# Patient Record
Sex: FEMALE | HISPANIC OR LATINO | Employment: STUDENT | URBAN - METROPOLITAN AREA
[De-identification: names, ages, dates, MRNs, and addresses within clinical notes are randomized per-mention and may not be internally consistent; named-entity substitution may affect disease eponyms.]

---

## 2023-09-16 ENCOUNTER — HOSPITAL ENCOUNTER (OUTPATIENT)
Dept: DATA CONVERSION | Facility: HOSPITAL | Age: 23
Discharge: HOME | End: 2023-09-16
Payer: COMMERCIAL

## 2023-09-16 DIAGNOSIS — E78.5 HYPERLIPIDEMIA, UNSPECIFIED: ICD-10-CM

## 2023-09-16 DIAGNOSIS — L68.0 HIRSUTISM: ICD-10-CM

## 2023-09-16 DIAGNOSIS — E78.6 LIPOPROTEIN DEFICIENCY: ICD-10-CM

## 2023-09-16 DIAGNOSIS — I10 ESSENTIAL (PRIMARY) HYPERTENSION: ICD-10-CM

## 2023-09-16 LAB
ALBUMIN SERPL-MCNC: 4.2 GM/DL (ref 3.5–5)
ALBUMIN/GLOB SERPL: 1.3 RATIO (ref 1.5–3)
ALP BLD-CCNC: 65 U/L (ref 35–125)
ALT SERPL-CCNC: 15 U/L (ref 5–40)
ANION GAP SERPL CALCULATED.3IONS-SCNC: 11 MMOL/L (ref 0–19)
APPEARANCE PLAS: ABNORMAL
AST SERPL-CCNC: 17 U/L (ref 5–40)
BASOPHILS # BLD AUTO: 0.02 K/UL (ref 0–0.22)
BASOPHILS NFR BLD AUTO: 0.3 % (ref 0–1)
BILIRUB SERPL-MCNC: 0.2 MG/DL (ref 0.1–1.2)
BUN SERPL-MCNC: 9 MG/DL (ref 8–25)
BUN/CREAT SERPL: 15 RATIO (ref 8–21)
CALCIUM SERPL-MCNC: 9.3 MG/DL (ref 8.5–10.4)
CHLORIDE SERPL-SCNC: 106 MMOL/L (ref 97–107)
CHOLEST SERPL-MCNC: 206 MG/DL (ref 133–200)
CHOLEST/HDLC SERPL: 6.6 RATIO
CO2 SERPL-SCNC: 26 MMOL/L (ref 24–31)
COLOR SPUN FLD: ABNORMAL
CREAT SERPL-MCNC: 0.6 MG/DL (ref 0.4–1.6)
DEPRECATED RDW RBC AUTO: 42.3 FL (ref 37–54)
DIFFERENTIAL METHOD BLD: ABNORMAL
EOSINOPHIL # BLD AUTO: 0.32 K/UL (ref 0–0.45)
EOSINOPHIL NFR BLD: 4.3 % (ref 0–3)
ERYTHROCYTE [DISTWIDTH] IN BLOOD BY AUTOMATED COUNT: 12.5 % (ref 11.7–15)
FASTING STATUS PATIENT QL REPORTED: ABNORMAL
GFR SERPL CREATININE-BSD FRML MDRD: 130 ML/MIN/1.73 M2
GLOBULIN SER-MCNC: 3.2 G/DL (ref 1.9–3.7)
GLUCOSE SERPL-MCNC: 130 MG/DL (ref 65–99)
HBA1C MFR BLD: 6.1 % (ref 4–6)
HCT VFR BLD AUTO: 44.7 % (ref 36–44)
HDLC SERPL-MCNC: 31 MG/DL
HGB BLD-MCNC: 14.4 GM/DL (ref 12–15)
IMM GRANULOCYTES # BLD AUTO: 0.01 K/UL (ref 0–0.1)
LDLC SERPL CALC-MCNC: 127 MG/DL (ref 65–130)
LYMPHOCYTES # BLD AUTO: 2.65 K/UL (ref 1.2–3.2)
LYMPHOCYTES NFR BLD MANUAL: 35.7 % (ref 20–40)
MCH RBC QN AUTO: 29.5 PG (ref 26–34)
MCHC RBC AUTO-ENTMCNC: 32.2 % (ref 31–37)
MCV RBC AUTO: 91.6 FL (ref 80–100)
MONOCYTES # BLD AUTO: 0.45 K/UL (ref 0–0.8)
MONOCYTES NFR BLD MANUAL: 6.1 % (ref 0–8)
NEUTROPHILS # BLD AUTO: 3.97 K/UL
NEUTROPHILS # BLD AUTO: 3.97 K/UL (ref 1.8–7.7)
NEUTROPHILS.IMMATURE NFR BLD: 0.1 % (ref 0–1)
NEUTS SEG NFR BLD: 53.5 % (ref 50–70)
NRBC BLD-RTO: 0 /100 WBC
PLATELET # BLD AUTO: 215 K/UL (ref 150–450)
PMV BLD AUTO: 11.7 CU (ref 7–12.6)
POTASSIUM SERPL-SCNC: 4.2 MMOL/L (ref 3.4–5.1)
PROLACTIN SERPL-MCNC: 127.9 NG/ML (ref 4.8–23.3)
PROT SERPL-MCNC: 7.4 G/DL (ref 5.9–7.9)
RBC # BLD AUTO: 4.88 M/UL (ref 4–4.9)
SODIUM SERPL-SCNC: 143 MMOL/L (ref 133–145)
TRIGL SERPL-MCNC: 238 MG/DL (ref 40–150)
WBC # BLD AUTO: 7.4 K/UL (ref 4.5–11)

## 2023-12-21 ENCOUNTER — OFFICE VISIT (OUTPATIENT)
Dept: PRIMARY CARE | Facility: CLINIC | Age: 23
End: 2023-12-21
Payer: COMMERCIAL

## 2023-12-21 VITALS
DIASTOLIC BLOOD PRESSURE: 80 MMHG | OXYGEN SATURATION: 96 % | SYSTOLIC BLOOD PRESSURE: 118 MMHG | TEMPERATURE: 97.9 F | WEIGHT: 247 LBS | BODY MASS INDEX: 38.77 KG/M2 | HEART RATE: 96 BPM | HEIGHT: 67 IN

## 2023-12-21 DIAGNOSIS — K52.9 ACUTE GASTROENTERITIS: Primary | ICD-10-CM

## 2023-12-21 DIAGNOSIS — R11.2 NAUSEA AND VOMITING, UNSPECIFIED VOMITING TYPE: ICD-10-CM

## 2023-12-21 PROBLEM — N92.6 IRREGULAR PERIODS: Status: RESOLVED | Noted: 2020-01-24 | Resolved: 2023-12-21

## 2023-12-21 PROBLEM — M79.604 RIGHT LEG PAIN: Status: RESOLVED | Noted: 2020-10-19 | Resolved: 2023-12-21

## 2023-12-21 PROBLEM — E66.01 MORBID OBESITY (MULTI): Status: ACTIVE | Noted: 2023-12-21

## 2023-12-21 PROBLEM — R11.10 POST-TUSSIVE EMESIS: Status: RESOLVED | Noted: 2022-05-02 | Resolved: 2023-12-21

## 2023-12-21 PROBLEM — F30.8 EXCITATIVE TYPE PSYCHOSIS (MULTI): Status: RESOLVED | Noted: 2023-12-21 | Resolved: 2023-12-21

## 2023-12-21 PROBLEM — M25.571 BILATERAL ANKLE PAIN: Status: RESOLVED | Noted: 2021-05-11 | Resolved: 2023-12-21

## 2023-12-21 PROBLEM — M79.671 BILATERAL FOOT PAIN: Status: RESOLVED | Noted: 2021-05-11 | Resolved: 2023-12-21

## 2023-12-21 PROBLEM — R79.89 ELEVATED PROLACTIN LEVEL: Status: RESOLVED | Noted: 2022-05-25 | Resolved: 2023-12-21

## 2023-12-21 PROBLEM — R73.03 PREDIABETES: Status: ACTIVE | Noted: 2022-05-12

## 2023-12-21 PROBLEM — F32.3 SEVERE MAJOR DEPRESSION WITH PSYCHOTIC FEATURES (MULTI): Status: ACTIVE | Noted: 2023-12-21

## 2023-12-21 PROBLEM — F99 MENTAL DISORDER: Status: RESOLVED | Noted: 2023-12-21 | Resolved: 2023-12-21

## 2023-12-21 PROBLEM — R05.1 ACUTE COUGH: Status: RESOLVED | Noted: 2023-02-14 | Resolved: 2023-12-21

## 2023-12-21 PROBLEM — R56.9 SEIZURE (MULTI): Status: ACTIVE | Noted: 2023-12-21

## 2023-12-21 PROBLEM — J34.89 RHINORRHEA: Status: RESOLVED | Noted: 2022-05-02 | Resolved: 2023-12-21

## 2023-12-21 PROBLEM — R07.0 THROAT PAIN: Status: RESOLVED | Noted: 2023-02-14 | Resolved: 2023-12-21

## 2023-12-21 PROBLEM — M79.672 BILATERAL FOOT PAIN: Status: RESOLVED | Noted: 2021-05-11 | Resolved: 2023-12-21

## 2023-12-21 PROBLEM — Z86.16 HISTORY OF COVID-19: Status: RESOLVED | Noted: 2023-12-21 | Resolved: 2023-12-21

## 2023-12-21 PROBLEM — I10 ESSENTIAL HYPERTENSION: Status: ACTIVE | Noted: 2020-01-24

## 2023-12-21 PROBLEM — E55.9 VITAMIN D DEFICIENCY: Status: ACTIVE | Noted: 2023-12-21

## 2023-12-21 PROBLEM — E78.5 HYPERLIPIDEMIA: Status: ACTIVE | Noted: 2020-01-24

## 2023-12-21 PROBLEM — R00.0 TACHYCARDIA: Status: RESOLVED | Noted: 2021-05-11 | Resolved: 2023-12-21

## 2023-12-21 PROBLEM — B07.9 WARTS: Status: RESOLVED | Noted: 2022-05-12 | Resolved: 2023-12-21

## 2023-12-21 PROBLEM — R44.0 AUDITORY HALLUCINATIONS: Status: RESOLVED | Noted: 2023-12-21 | Resolved: 2023-12-21

## 2023-12-21 PROBLEM — U07.1 PNEUMONIA DUE TO COVID-19 VIRUS: Status: RESOLVED | Noted: 2021-01-29 | Resolved: 2023-12-21

## 2023-12-21 PROBLEM — E28.2 POLYCYSTIC OVARY SYNDROME: Status: ACTIVE | Noted: 2020-01-24

## 2023-12-21 PROBLEM — E22.1 HYPERPROLACTINEMIA (MULTI): Status: ACTIVE | Noted: 2020-02-28

## 2023-12-21 PROBLEM — F84.0 AUTISM (HHS-HCC): Status: ACTIVE | Noted: 2020-01-24

## 2023-12-21 PROBLEM — L68.0 HIRSUTISM: Status: ACTIVE | Noted: 2023-12-21

## 2023-12-21 PROBLEM — E23.6: Status: RESOLVED | Noted: 2020-10-21 | Resolved: 2023-12-21

## 2023-12-21 PROBLEM — N91.2 AMENORRHEA: Status: RESOLVED | Noted: 2020-02-28 | Resolved: 2023-12-21

## 2023-12-21 PROBLEM — E87.6 HYPOKALEMIA: Status: RESOLVED | Noted: 2023-12-21 | Resolved: 2023-12-21

## 2023-12-21 PROBLEM — R45.6 VIOLENT BEHAVIOR: Status: RESOLVED | Noted: 2023-12-21 | Resolved: 2023-12-21

## 2023-12-21 PROBLEM — R26.9 GAIT ABNORMALITY: Status: ACTIVE | Noted: 2021-05-11

## 2023-12-21 PROBLEM — R40.1 CLOUDED CONSCIOUSNESS: Status: RESOLVED | Noted: 2023-12-21 | Resolved: 2023-12-21

## 2023-12-21 PROBLEM — J00 ACUTE NASOPHARYNGITIS: Status: RESOLVED | Noted: 2022-05-02 | Resolved: 2023-12-21

## 2023-12-21 PROBLEM — F20.9 SCHIZOPHRENIA (MULTI): Status: ACTIVE | Noted: 2020-01-24

## 2023-12-21 PROBLEM — E78.6 LOW HDL (UNDER 40): Status: ACTIVE | Noted: 2023-12-21

## 2023-12-21 PROBLEM — R51.9 HEADACHE: Status: RESOLVED | Noted: 2023-02-14 | Resolved: 2023-12-21

## 2023-12-21 PROBLEM — M25.572 BILATERAL ANKLE PAIN: Status: RESOLVED | Noted: 2021-05-11 | Resolved: 2023-12-21

## 2023-12-21 PROBLEM — R68.83 CHILL: Status: RESOLVED | Noted: 2023-12-21 | Resolved: 2023-12-21

## 2023-12-21 PROBLEM — R55 SYNCOPE: Status: RESOLVED | Noted: 2023-12-21 | Resolved: 2023-12-21

## 2023-12-21 PROBLEM — J12.82 PNEUMONIA DUE TO COVID-19 VIRUS: Status: RESOLVED | Noted: 2021-01-29 | Resolved: 2023-12-21

## 2023-12-21 PROCEDURE — 3074F SYST BP LT 130 MM HG: CPT | Performed by: FAMILY MEDICINE

## 2023-12-21 PROCEDURE — 1036F TOBACCO NON-USER: CPT | Performed by: FAMILY MEDICINE

## 2023-12-21 PROCEDURE — 3079F DIAST BP 80-89 MM HG: CPT | Performed by: FAMILY MEDICINE

## 2023-12-21 PROCEDURE — 99213 OFFICE O/P EST LOW 20 MIN: CPT | Performed by: FAMILY MEDICINE

## 2023-12-21 RX ORDER — LORAZEPAM 0.5 MG/1
0.5 TABLET ORAL 3 TIMES DAILY
COMMUNITY
Start: 2023-09-11

## 2023-12-21 RX ORDER — ARIPIPRAZOLE 20 MG/1
1 TABLET ORAL DAILY
COMMUNITY
Start: 2018-12-29

## 2023-12-21 RX ORDER — TRAZODONE HYDROCHLORIDE 50 MG/1
50 TABLET ORAL NIGHTLY
COMMUNITY

## 2023-12-21 RX ORDER — CHLORTHALIDONE 25 MG/1
TABLET ORAL
COMMUNITY
Start: 2019-03-01

## 2023-12-21 RX ORDER — ESCITALOPRAM OXALATE 20 MG/1
20 TABLET ORAL
COMMUNITY

## 2023-12-21 RX ORDER — PROMETHAZINE HYDROCHLORIDE 25 MG/1
25 TABLET ORAL EVERY 6 HOURS PRN
Qty: 20 TABLET | Refills: 0 | Status: SHIPPED | OUTPATIENT
Start: 2023-12-21

## 2023-12-21 RX ORDER — HALOPERIDOL 10 MG/1
10 TABLET ORAL 2 TIMES DAILY
COMMUNITY

## 2023-12-21 RX ORDER — RISPERIDONE 1 MG/1
TABLET ORAL
COMMUNITY

## 2023-12-21 RX ORDER — METFORMIN HYDROCHLORIDE 500 MG/1
1 TABLET ORAL DAILY
COMMUNITY
Start: 2018-12-29

## 2023-12-21 RX ORDER — LORATADINE 10 MG/1
TABLET ORAL
COMMUNITY
Start: 2023-02-14 | End: 2024-02-27 | Stop reason: WASHOUT

## 2023-12-21 RX ORDER — PALIPERIDONE 9 MG/1
1 TABLET, EXTENDED RELEASE ORAL DAILY
COMMUNITY
Start: 2019-02-20

## 2023-12-21 RX ORDER — GUANFACINE 1 MG/1
1 TABLET, EXTENDED RELEASE ORAL DAILY
COMMUNITY
Start: 2020-04-20

## 2023-12-21 ASSESSMENT — ENCOUNTER SYMPTOMS
DIARRHEA: 1
ABDOMINAL PAIN: 1
NAUSEA: 1
VOMITING: 1

## 2023-12-21 ASSESSMENT — PATIENT HEALTH QUESTIONNAIRE - PHQ9
SUM OF ALL RESPONSES TO PHQ9 QUESTIONS 1 AND 2: 0
1. LITTLE INTEREST OR PLEASURE IN DOING THINGS: NOT AT ALL
2. FEELING DOWN, DEPRESSED OR HOPELESS: NOT AT ALL

## 2023-12-21 ASSESSMENT — PAIN SCALES - GENERAL: PAINLEVEL: 5

## 2023-12-21 NOTE — PROGRESS NOTES
"Subjective   Patient ID: Kimberlee Patrick is a 23 y.o. female who presents for Vomiting (X 5 days ).    Acute illness: began 5 days ago, admits to vomiting, diarrhea, left abdominal pain, and decreased appetite, denies fever and chills, treated at home with herbal teas and no success, vomiting occurs about 5 minutes after eating, no sick contacts at home.    Review of Systems   Gastrointestinal:  Positive for abdominal pain, diarrhea, nausea and vomiting.     Objective   /80 (BP Location: Left arm)   Pulse 96   Temp 36.6 °C (97.9 °F) (Temporal)   Ht 1.702 m (5' 7\")   Wt 112 kg (247 lb)   SpO2 96%   BMI 38.69 kg/m²     Physical Exam  Vitals reviewed.   Constitutional:       Appearance: Normal appearance. She is obese.      Comments: Accompanied by her sister.   Cardiovascular:      Rate and Rhythm: Normal rate and regular rhythm.   Pulmonary:      Effort: Pulmonary effort is normal.      Breath sounds: Normal breath sounds.   Abdominal:      General: Bowel sounds are normal.      Palpations: Abdomen is soft.      Tenderness: There is no abdominal tenderness. There is no guarding.   Neurological:      Mental Status: She is alert.     Assessment/Plan   Diagnoses and all orders for this visit:  Acute gastroenteritis/Nausea and vomiting, unspecified vomiting type  New.  Most likely viral.  Promethazine (Phenergan) 25 mg tablet prescribed.    [place] diet.  Liquids and soft foods.  Advance diet as tolerated.  Follow up next week if not better.      "

## 2024-02-27 ENCOUNTER — OFFICE VISIT (OUTPATIENT)
Dept: PRIMARY CARE | Facility: CLINIC | Age: 24
End: 2024-02-27
Payer: COMMERCIAL

## 2024-02-27 VITALS
SYSTOLIC BLOOD PRESSURE: 122 MMHG | OXYGEN SATURATION: 91 % | HEART RATE: 126 BPM | WEIGHT: 252 LBS | DIASTOLIC BLOOD PRESSURE: 82 MMHG | TEMPERATURE: 97.8 F | BODY MASS INDEX: 39.47 KG/M2

## 2024-02-27 DIAGNOSIS — J06.9 ACUTE URI: Primary | ICD-10-CM

## 2024-02-27 PROCEDURE — 3074F SYST BP LT 130 MM HG: CPT | Performed by: FAMILY MEDICINE

## 2024-02-27 PROCEDURE — 1036F TOBACCO NON-USER: CPT | Performed by: FAMILY MEDICINE

## 2024-02-27 PROCEDURE — 99213 OFFICE O/P EST LOW 20 MIN: CPT | Performed by: FAMILY MEDICINE

## 2024-02-27 PROCEDURE — 3079F DIAST BP 80-89 MM HG: CPT | Performed by: FAMILY MEDICINE

## 2024-02-27 RX ORDER — TITANIUM DIOXIDE, OCTINOXATE, ZINC OXIDE, 5; 9.7; 2.9 MG/G; MG/G; MG/G
1 CREAM TOPICAL DAILY PRN
Qty: 14 TABLET | Refills: 0 | Status: SHIPPED | OUTPATIENT
Start: 2024-02-27

## 2024-02-27 ASSESSMENT — ENCOUNTER SYMPTOMS
SORE THROAT: 1
FEVER: 0
DIARRHEA: 0
COUGH: 1
RHINORRHEA: 0

## 2024-02-27 ASSESSMENT — PAIN SCALES - GENERAL: PAINLEVEL: 0-NO PAIN

## 2024-02-27 NOTE — PROGRESS NOTES
Subjective   Patient ID: Kimberlee Patrick is a 23 y.o. female who presents for Cough and Sore Throat (X 2 weeks).    Acute illness: began 2 days ago, admits to a productive cough with light brown sputum, throat pain, and nasal congestion, denies fever, otalgia, rhinorrhea, and diarrhea, has taken Tylenol, denies sick contacts.    Review of Systems   Constitutional:  Negative for fever.   HENT:  Positive for congestion and sore throat. Negative for ear pain and rhinorrhea.    Respiratory:  Positive for cough.    Gastrointestinal:  Negative for diarrhea.     Objective   /82 (BP Location: Left arm)   Pulse (!) 126   Temp 36.6 °C (97.8 °F) (Temporal)   Wt 114 kg (252 lb)   SpO2 91%   BMI 39.47 kg/m²     Physical Exam  Vitals reviewed.   Constitutional:       Appearance: Normal appearance. She is obese.      Comments: Accompanied by father   HENT:      Right Ear: Hearing, tympanic membrane, ear canal and external ear normal.      Left Ear: Hearing, tympanic membrane, ear canal and external ear normal.      Nose: Mucosal edema, congestion and rhinorrhea present. Rhinorrhea is clear.      Right Turbinates: Swollen.      Left Turbinates: Swollen.      Mouth/Throat:      Mouth: Mucous membranes are moist.      Pharynx: Posterior oropharyngeal erythema (faint) present. No pharyngeal swelling, oropharyngeal exudate or uvula swelling.   Eyes:      Conjunctiva/sclera: Conjunctivae normal.   Cardiovascular:      Rate and Rhythm: Normal rate and regular rhythm.   Pulmonary:      Effort: Pulmonary effort is normal.      Breath sounds: Normal breath sounds.   Neurological:      Mental Status: She is alert.     Assessment/Plan   Diagnoses and all orders for this visit:  Acute URI  New.  Loratadine-pseudoephedrine (Allergy Relief D-24hr)  mg 24 hr tablet prescribed.  Adequate hydration.  Get plenty of rest.  Follow up if symptoms worsen.

## 2025-06-05 ENCOUNTER — TELEPHONE (OUTPATIENT)
Dept: PRIMARY CARE | Facility: CLINIC | Age: 25
End: 2025-06-05
Payer: COMMERCIAL

## 2025-06-05 DIAGNOSIS — R73.03 PREDIABETES: ICD-10-CM

## 2025-06-05 DIAGNOSIS — E78.2 MIXED DYSLIPIDEMIA: ICD-10-CM

## 2025-06-05 DIAGNOSIS — E22.1 HYPERPROLACTINEMIA (MULTI): Primary | ICD-10-CM

## 2025-06-08 PROBLEM — E78.2 MIXED DYSLIPIDEMIA: Status: ACTIVE | Noted: 2020-01-24

## 2025-06-08 PROBLEM — E78.2 MIXED DYSLIPIDEMIA: Status: ACTIVE | Noted: 2025-06-08

## 2025-08-13 DIAGNOSIS — E78.2 MIXED DYSLIPIDEMIA: ICD-10-CM

## 2025-08-13 DIAGNOSIS — E22.1 HYPERPROLACTINEMIA (MULTI): ICD-10-CM

## 2025-08-13 DIAGNOSIS — R73.03 PREDIABETES: ICD-10-CM

## 2025-08-14 ENCOUNTER — RESULTS FOLLOW-UP (OUTPATIENT)
Dept: PRIMARY CARE | Facility: CLINIC | Age: 25
End: 2025-08-14
Payer: COMMERCIAL

## 2025-08-14 LAB
ALBUMIN SERPL-MCNC: 4.2 G/DL (ref 3.6–5.1)
ALP SERPL-CCNC: 44 U/L (ref 31–125)
ALT SERPL-CCNC: 17 U/L (ref 6–29)
ANION GAP SERPL CALCULATED.4IONS-SCNC: 8 MMOL/L (CALC) (ref 7–17)
AST SERPL-CCNC: 16 U/L (ref 10–30)
BILIRUB SERPL-MCNC: 0.4 MG/DL (ref 0.2–1.2)
BUN SERPL-MCNC: 8 MG/DL (ref 7–25)
CALCIUM SERPL-MCNC: 9 MG/DL (ref 8.6–10.2)
CHLORIDE SERPL-SCNC: 106 MMOL/L (ref 98–110)
CHOLEST SERPL-MCNC: 183 MG/DL
CHOLEST/HDLC SERPL: 6.3 (CALC)
CO2 SERPL-SCNC: 29 MMOL/L (ref 20–32)
CREAT SERPL-MCNC: 0.7 MG/DL (ref 0.5–0.96)
EGFRCR SERPLBLD CKD-EPI 2021: 124 ML/MIN/1.73M2
EST. AVERAGE GLUCOSE BLD GHB EST-MCNC: 137 MG/DL
EST. AVERAGE GLUCOSE BLD GHB EST-SCNC: 7.6 MMOL/L
GLUCOSE SERPL-MCNC: 126 MG/DL (ref 65–99)
HBA1C MFR BLD: 6.4 %
HDLC SERPL-MCNC: 29 MG/DL
LDLC SERPL CALC-MCNC: 117 MG/DL (CALC)
NONHDLC SERPL-MCNC: 154 MG/DL (CALC)
POTASSIUM SERPL-SCNC: 4 MMOL/L (ref 3.5–5.3)
PROLACTIN SERPL-MCNC: 30.1 NG/ML
PROT SERPL-MCNC: 6.7 G/DL (ref 6.1–8.1)
SODIUM SERPL-SCNC: 143 MMOL/L (ref 135–146)
TRIGL SERPL-MCNC: 261 MG/DL

## 2025-08-20 ENCOUNTER — APPOINTMENT (OUTPATIENT)
Dept: PRIMARY CARE | Facility: CLINIC | Age: 25
End: 2025-08-20
Payer: COMMERCIAL

## 2025-08-20 VITALS
BODY MASS INDEX: 39.39 KG/M2 | OXYGEN SATURATION: 93 % | WEIGHT: 251 LBS | TEMPERATURE: 96.8 F | SYSTOLIC BLOOD PRESSURE: 108 MMHG | HEART RATE: 92 BPM | DIASTOLIC BLOOD PRESSURE: 66 MMHG | HEIGHT: 67 IN

## 2025-08-20 DIAGNOSIS — R73.03 PREDIABETES: ICD-10-CM

## 2025-08-20 DIAGNOSIS — I10 ESSENTIAL HYPERTENSION: ICD-10-CM

## 2025-08-20 DIAGNOSIS — Z12.4 CERVICAL CANCER SCREENING: ICD-10-CM

## 2025-08-20 DIAGNOSIS — F20.9 SCHIZOPHRENIA, UNSPECIFIED TYPE: ICD-10-CM

## 2025-08-20 DIAGNOSIS — Z00.00 ANNUAL PHYSICAL EXAM: Primary | ICD-10-CM

## 2025-08-20 DIAGNOSIS — E22.1 HYPERPROLACTINEMIA (MULTI): ICD-10-CM

## 2025-08-20 DIAGNOSIS — E78.2 MIXED DYSLIPIDEMIA: ICD-10-CM

## 2025-08-20 DIAGNOSIS — Z23 NEED FOR TDAP VACCINATION: ICD-10-CM

## 2025-08-20 PROBLEM — E66.01 MORBID OBESITY (MULTI): Status: RESOLVED | Noted: 2023-12-21 | Resolved: 2025-08-20

## 2025-08-20 PROBLEM — R68.89 SPELLS OF DECREASED ATTENTIVENESS: Status: ACTIVE | Noted: 2025-08-20

## 2025-08-20 PROBLEM — F81.9 LEARNING DISABILITY: Status: ACTIVE | Noted: 2025-08-20

## 2025-08-20 PROBLEM — F32.A DEPRESSION: Status: ACTIVE | Noted: 2025-08-20

## 2025-08-20 PROBLEM — G44.209 TENSION TYPE HEADACHE: Status: ACTIVE | Noted: 2025-08-20

## 2025-08-20 PROBLEM — B30.9 VIRAL CONJUNCTIVITIS: Status: RESOLVED | Noted: 2017-06-28 | Resolved: 2025-08-20

## 2025-08-20 PROBLEM — F81.9 COGNITIVE DEVELOPMENTAL DELAY: Status: ACTIVE | Noted: 2025-08-20

## 2025-08-20 PROBLEM — J00 COMMON COLD: Status: RESOLVED | Noted: 2022-05-02 | Resolved: 2025-08-20

## 2025-08-20 PROBLEM — F41.9 ANXIETY: Status: ACTIVE | Noted: 2025-08-20

## 2025-08-20 RX ORDER — CARIPRAZINE 3 MG/1
1 CAPSULE, GELATIN COATED ORAL
COMMUNITY
Start: 2025-07-22

## 2025-08-20 ASSESSMENT — ENCOUNTER SYMPTOMS
GASTROINTESTINAL NEGATIVE: 1
NEUROLOGICAL NEGATIVE: 1
PSYCHIATRIC NEGATIVE: 1
MUSCULOSKELETAL NEGATIVE: 1
HEMATOLOGIC/LYMPHATIC NEGATIVE: 1
ALLERGIC/IMMUNOLOGIC NEGATIVE: 1
RESPIRATORY NEGATIVE: 1
CONSTITUTIONAL NEGATIVE: 1
CARDIOVASCULAR NEGATIVE: 1
EYES NEGATIVE: 1

## 2025-08-20 ASSESSMENT — PATIENT HEALTH QUESTIONNAIRE - PHQ9
2. FEELING DOWN, DEPRESSED OR HOPELESS: NOT AT ALL
1. LITTLE INTEREST OR PLEASURE IN DOING THINGS: NOT AT ALL
SUM OF ALL RESPONSES TO PHQ9 QUESTIONS 1 AND 2: 0

## 2025-08-20 ASSESSMENT — COLUMBIA-SUICIDE SEVERITY RATING SCALE - C-SSRS
1. IN THE PAST MONTH, HAVE YOU WISHED YOU WERE DEAD OR WISHED YOU COULD GO TO SLEEP AND NOT WAKE UP?: NO
2. HAVE YOU ACTUALLY HAD ANY THOUGHTS OF KILLING YOURSELF?: NO
6. HAVE YOU EVER DONE ANYTHING, STARTED TO DO ANYTHING, OR PREPARED TO DO ANYTHING TO END YOUR LIFE?: NO